# Patient Record
(demographics unavailable — no encounter records)

---

## 2025-03-17 NOTE — HISTORY OF PRESENT ILLNESS
[Born at ___ Wks Gestation] : The patient was born at [unfilled] weeks gestation [C/S] : via  section [Other: _____] : at [unfilled] [BW: _____] : weight of [unfilled] [Length: _____] : length of [unfilled] [DW: _____] : Discharge weight was [unfilled] [Time of Birth: _____] : Time of birth was [unfilled] [Nirsevimab Given] : Nirsevimab given  [C/S Indication: ____] : ( [unfilled] ) [Respiratory Distress] : respiratory distress [NICU Resuscitation] : NICU resuscitation [Rubella (Immune)] : Rubella immune [MBT: ____] : MBT - [unfilled] [Breast milk] : breast milk [Expressed Breast milk ___oz/feed] : [unfilled] oz of expressed breast milk per feed [Formula ___ oz/feed] : [unfilled] oz of formula per feed [Hours between feeds ___] : Child is fed every [unfilled] hours [Normal] : Normal [___ voids per day] : [unfilled] voids per day [Frequency of stools: ___] : Frequency of stools: [unfilled]  stools [Pacifier] : Uses pacifier [No] : No cigarette smoke exposure [HepBsAG] : HepBsAg negative [HIV] : HIV negative [HepC] : Hepatitis C negative [VDRL/RPR (Reactive)] : VDRL/RPR nonreactive [FreeTextEntry8] : Nirsevimab given, hep b vaccine declined  Per parents was in NICU on CPAP x1-2 days for fluid in lungs [Hepatitis B Vaccine Given] : Hepatitis B vaccine not given [de-identified] : Not spitting up [de-identified] : 3/14/25

## 2025-03-17 NOTE — PHYSICAL EXAM
[Alert] : alert [Acute Distress] : no acute distress [Normocephalic] : normocephalic [Flat Open Anterior Mound] : flat open anterior fontanelle [Icteric sclera] : nonicteric sclera [PERRL] : PERRL [Red Reflex Bilateral] : red reflex bilateral [Normally Placed Ears] : normally placed ears [Auricles Well Formed] : auricles well formed [Clear Tympanic membranes] : clear tympanic membranes [Light reflex present] : light reflex present [Bony structures visible] : bony structures visible [Patent Auditory Canal] : patent auditory canal [Discharge] : no discharge [Nares Patent] : nares patent [Palate Intact] : palate intact [Uvula Midline] : uvula midline [Supple, full passive range of motion] : supple, full passive range of motion [Palpable Masses] : no palpable masses [Symmetric Chest Rise] : symmetric chest rise [Clear to Auscultation Bilaterally] : clear to auscultation bilaterally [Regular Rate and Rhythm] : regular rate and rhythm [S1, S2 present] : S1, S2 present [Murmurs] : no murmurs [+2 Femoral Pulses] : +2 femoral pulses [Soft] : soft [Tender] : nontender [Distended] : not distended [Bowel Sounds] : bowel sounds present [Umbilical Stump Dry, Clean, Intact] : umbilical stump dry, clean, intact [Hepatomegaly] : no hepatomegaly [Splenomegaly] : no splenomegaly [Normal external genitailia] : normal external genitalia [Central Urethral Opening] : central urethral opening [Testicles Descended Bilaterally] : testicles descended bilaterally [Patent] : patent [Normally Placed] : normally placed [No Abnormal Lymph Nodes Palpated] : no abnormal lymph nodes palpated [Sena-Ortolani] : negative Sena-Ortolani [Symmetric Flexed Extremities] : symmetric flexed extremities [Spinal Dimple] : no spinal dimple [Tuft of Hair] : no tuft of hair [Startle Reflex] : startle reflex present [Suck Reflex] : suck reflex present [Rooting] : rooting reflex present [Palmar Grasp] : palmar grasp present [Plantar Grasp] : plantar reflex present [Symmetric [place]] : symmetric [place] [Jaundice] : not jaundice

## 2025-03-17 NOTE — DISCUSSION/SUMMARY
[ Transition] :  transition [ Care] :  care [Nutritional Adequacy] : nutritional adequacy [Parental Well-Being] : parental well-being [Safety] : safety [Hepatitis B In Hospital] : Hepatitis B not administered while in the hospital [Mother] : mother [Father] : father [] : The components of the vaccine(s) to be administered today are listed in the plan of care. The disease(s) for which the vaccine(s) are intended to prevent and the risks have been discussed with the caretaker.  The risks are also included in the appropriate vaccination information statements which have been provided to the patient's caregiver.  The caregiver has given consent to vaccinate. [FreeTextEntry1] : - Script given for Hip US to be done at 6 weeks of age - Follow up in 1 week for weight check

## 2025-03-21 NOTE — HISTORY OF PRESENT ILLNESS
[de-identified] : weight check [FreeTextEntry6] : Here today for weight check.  - Bottle feeding, 3oz q2-3hrs.  Not spitting up. - Voiding and stooling well, yellow stools. - Good sleeping patterns. -  No parental concerns.

## 2025-03-29 NOTE — HISTORY OF PRESENT ILLNESS
[de-identified] : loose stool and rash on face x2 days. No fevers.  [FreeTextEntry6] : 2-3 loose stool a day but drinks 3 ounces - gained almost a pound since 3/21 - not running out of diaper rash on face bumpy and gets redder jenn cries- using eczema cream

## 2025-03-29 NOTE — DISCUSSION/SUMMARY
[FreeTextEntry1] : good weight gain- loose stool 2-3 times a day ok only water to face- start mupirocin but appears possible acne lesions- miliaria- but erythematous

## 2025-03-29 NOTE — PHYSICAL EXAM
[NL] : regular rate and rhythm, normal S1, S2 audible, no murmurs [de-identified] : on cheeks above eyebrows- mid face with small papular and some clear blistery lesions - skin is mildly inflamed